# Patient Record
(demographics unavailable — no encounter records)

---

## 2025-07-10 NOTE — HISTORY OF PRESENT ILLNESS
[FreeTextEntry6] : need physical for working papers no complaints is a swimmer and competes regularly

## 2025-07-10 NOTE — ADDENDUM
[FreeTextEntry1] : We will repeat his thyroids testing next visit when he returns for immunizations Otherwise well exam